# Patient Record
Sex: MALE | Race: WHITE | NOT HISPANIC OR LATINO | URBAN - METROPOLITAN AREA
[De-identification: names, ages, dates, MRNs, and addresses within clinical notes are randomized per-mention and may not be internally consistent; named-entity substitution may affect disease eponyms.]

---

## 2019-03-25 ENCOUNTER — IMPORTED ENCOUNTER (OUTPATIENT)
Dept: URBAN - METROPOLITAN AREA CLINIC 43 | Facility: CLINIC | Age: 59
End: 2019-03-25

## 2020-01-16 NOTE — PATIENT DISCUSSION
PT JUST HAD CAROTID CT TODAY - PT HAD SURGERY IN LEFT CAROTID AND ON RIGHT LEG AND BOTH STENTS ARE GETTING BLOCKED.

## 2022-11-22 NOTE — PATIENT DISCUSSION
11 22 22 RECURRENT -  - ON RIGHT GAZE - NOT ALL TIME - FROM - RECOM EVAL NEUROLOGY TO CHECK FOR CNS CAUSE AND MYASTHENIA.